# Patient Record
Sex: FEMALE | Race: ASIAN
[De-identification: names, ages, dates, MRNs, and addresses within clinical notes are randomized per-mention and may not be internally consistent; named-entity substitution may affect disease eponyms.]

---

## 2023-02-22 ENCOUNTER — HOSPITAL ENCOUNTER (EMERGENCY)
Dept: HOSPITAL 75 - ER | Age: 73
Discharge: HOME | End: 2023-02-22
Payer: MEDICARE

## 2023-02-22 VITALS — DIASTOLIC BLOOD PRESSURE: 130 MMHG | SYSTOLIC BLOOD PRESSURE: 206 MMHG

## 2023-02-22 VITALS — WEIGHT: 139.99 LBS | HEIGHT: 60 IN | BODY MASS INDEX: 27.48 KG/M2

## 2023-02-22 DIAGNOSIS — Z79.82: ICD-10-CM

## 2023-02-22 DIAGNOSIS — S42.001A: Primary | ICD-10-CM
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
## 2023-02-22 DIAGNOSIS — E11.9: ICD-10-CM

## 2023-02-22 DIAGNOSIS — R07.89: ICD-10-CM
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
## 2023-02-22 DIAGNOSIS — Z88.5: ICD-10-CM

## 2023-02-22 LAB
ALBUMIN SERPL-MCNC: 4.3 GM/DL (ref 3.2–4.5)
ALP SERPL-CCNC: 79 U/L (ref 40–136)
ALT SERPL-CCNC: 20 U/L (ref 0–55)
APTT BLD: 33 SEC (ref 24–35)
BASOPHILS # BLD AUTO: 0.1 10^3/UL (ref 0–0.1)
BASOPHILS NFR BLD AUTO: 1 % (ref 0–10)
BILIRUB SERPL-MCNC: 0.4 MG/DL (ref 0.1–1)
BUN/CREAT SERPL: 22
CALCIUM SERPL-MCNC: 9.5 MG/DL (ref 8.5–10.1)
CHLORIDE SERPL-SCNC: 107 MMOL/L (ref 98–107)
CO2 SERPL-SCNC: 18 MMOL/L (ref 21–32)
CREAT SERPL-MCNC: 0.79 MG/DL (ref 0.6–1.3)
EOSINOPHIL # BLD AUTO: 0.1 10^3/UL (ref 0–0.3)
EOSINOPHIL NFR BLD AUTO: 1 % (ref 0–10)
GFR SERPLBLD BASED ON 1.73 SQ M-ARVRAT: 78 ML/MIN
GLUCOSE SERPL-MCNC: 127 MG/DL (ref 70–105)
HCT VFR BLD CALC: 40 % (ref 35–52)
HGB BLD-MCNC: 12.9 G/DL (ref 11.5–16)
INR PPP: 0.9 (ref 0.8–1.4)
LYMPHOCYTES # BLD AUTO: 1.8 10^3/UL (ref 1–4)
LYMPHOCYTES NFR BLD AUTO: 26 % (ref 12–44)
MANUAL DIFFERENTIAL PERFORMED BLD QL: NO
MCH RBC QN AUTO: 22 PG (ref 25–34)
MCHC RBC AUTO-ENTMCNC: 32 G/DL (ref 32–36)
MCV RBC AUTO: 70 FL (ref 80–99)
MONOCYTES # BLD AUTO: 0.7 10^3/UL (ref 0–1)
MONOCYTES NFR BLD AUTO: 9 % (ref 0–12)
NEUTROPHILS # BLD AUTO: 4.4 10^3/UL (ref 1.8–7.8)
NEUTROPHILS NFR BLD AUTO: 63 % (ref 42–75)
PLATELET # BLD: 349 10^3/UL (ref 130–400)
PMV BLD AUTO: 8.6 FL (ref 9–12.2)
POTASSIUM SERPL-SCNC: 4 MMOL/L (ref 3.6–5)
PROT SERPL-MCNC: 8.3 GM/DL (ref 6.4–8.2)
PROTHROMBIN TIME: 13 SEC (ref 12.2–14.7)
SODIUM SERPL-SCNC: 139 MMOL/L (ref 135–145)
WBC # BLD AUTO: 7.1 10^3/UL (ref 4.3–11)

## 2023-02-22 PROCEDURE — 85730 THROMBOPLASTIN TIME PARTIAL: CPT

## 2023-02-22 PROCEDURE — 80053 COMPREHEN METABOLIC PANEL: CPT

## 2023-02-22 PROCEDURE — 36415 COLL VENOUS BLD VENIPUNCTURE: CPT

## 2023-02-22 PROCEDURE — 73030 X-RAY EXAM OF SHOULDER: CPT

## 2023-02-22 PROCEDURE — 70450 CT HEAD/BRAIN W/O DYE: CPT

## 2023-02-22 PROCEDURE — 85610 PROTHROMBIN TIME: CPT

## 2023-02-22 PROCEDURE — 73060 X-RAY EXAM OF HUMERUS: CPT

## 2023-02-22 PROCEDURE — 85025 COMPLETE CBC W/AUTO DIFF WBC: CPT

## 2023-02-22 PROCEDURE — 71045 X-RAY EXAM CHEST 1 VIEW: CPT

## 2023-02-22 PROCEDURE — 72125 CT NECK SPINE W/O DYE: CPT

## 2023-02-22 PROCEDURE — 80320 DRUG SCREEN QUANTALCOHOLS: CPT

## 2023-02-22 PROCEDURE — 82947 ASSAY GLUCOSE BLOOD QUANT: CPT

## 2023-02-22 NOTE — ED FALL/INJURY
General


Stated Complaint:  FALL


Source:  patient (SPEECH IS DIFFICULT TO UNDERSTAND AT TIMES. )





History of Present Illness


Date Seen by Provider:  Feb 22, 2023


Time Seen by Provider:  01:08


Initial Comments


PT ARRIVES VIA EMS FROM LOCAL [facility]--PT LIVES IN [place], [place]


[facility] RIGHT SIDE


C/O PAIN TO RIGHT SHOULDER , UPPER ARM AND COLLAR BONE


DID HIT HER HEAD, BUT DENIES ANY LOSS OF CONSCIOUSNESS


DENIES ANY NECK OR BACK PAIN


DENIES ANY PARESTHESIAS OR MOTOR DEFICITS


DENIES ANY HIP OR LEG PAIN 





PT IS ON 81 MG ASPIRIN


SHE IS DIABETIC, HAS HTN, HX OF CVA, USES AN INHALER





SHE DENIES SMOKING, ALCOHOL OR DRUG USE





PCP:DR. TAVAREZ IN [place], [place]





Allergies and Home Medications


Allergies


Coded Allergies:  


     codeine (Verified  Allergy, Unknown, 2/22/23)





Review of Systems


Review of Systems


Constitutional:  no symptoms reported


Eyes:  No Symptoms Reported


Ears, Nose, Mouth, Throat:  no symptoms reported


Respiratory:  no symptoms reported


Cardiovascular:  no symptoms reported


Gastrointestinal:  no symptoms reported


Genitourinary:  no symptoms reported


Musculoskeletal:  see HPI


Skin:  no symptoms reported


Psychiatric/Neurological:  No Symptoms Reported





Past Medical-Social-Family Hx


Patient Social History


Tobacco Use?:  No


Use of E-Cig and/or Vaping dev:  No


Substance use?:  No


Alcohol Use?:  No





Past Medical History


Respiratory:  Yes (USES AN INHALER--PT UNABLE TO STATE WHAT LUNG PROBLEM SHE 

HAS)


Cardiac:  Yes


High Cholesterol, Hypertension


Neurological:  Yes


Stroke


GYN History:  Menopausal


Genitourinary:  Yes (INCONTINENCE)


Gastrointestinal:  No


Musculoskeletal:  Yes (LEFT WRIST FX AS CHILD)


Fractures


Endocrine:  Yes


Diabetes, Non-Insulin dep


HEENT:  No (GLASSES)


Cancer:  No


Psychosocial:  No


Integumentary:  No


Blood Disorders:  No





Physical Exam


Vital Signs





Vital Signs - First Documented








 2/22/23





 01:08


 


Pulse 72


 


Resp 18


 


B/P (MAP) 206/130 (155)


 


Pulse Ox 98


 


O2 Delivery Room Air





Capillary Refill :


Height, Weight, BMI


Height: '"


Weight: lbs. oz. kg;  BMI


Method:


General Appearance:  WD/WN, no apparent distress


HEENT:  PERRL/EOMI


Neck:  non-tender, full range of motion, supple, normal inspection


Cardiovascular:  regular rate, rhythm, no murmur


Respiratory:  normal breath sounds, no respiratory distress, no accessory muscle

use, other (RIGHT UPPER CHEST TENDERNESS. NO CREPITANCE OR SUB Q AIR. )


Gastrointestinal:  non tender, soft


Back:  other (RIGHT UPPER BACK/SCAPULAR /TRAPEZIUS TENDERNESS. )


Extremities:  normal capillary refill, other (TENDERNESS TO RIGHT MID AND UPPER 

HUMERUS, RIGHT SHOULDER AND ENTIRE RIGHT CLAVICLE. NO DEFORMITY. LIMITED ROM OF 

RIGHT SHOULDER)


Neurologic/Psychiatric:  CNs II-XII nml as tested, no motor/sensory deficits, 

alert, normal mood/affect, oriented x 3


Skin:  normal color (DARK SKINNED), warm/dry; No ecchymosis





M[place] Coma Score


Best Eye Response:  (4) Open Spontaneously


Best Verbal Response:  (5) Oriented


Best Motor Response:  (6) Obeys Commands


[place] Total:  15





Progress/Results/Core Measures


Results/Orders


Lab Results





Laboratory Tests








Test


 2/22/23


01:17 Range/Units


 


 


White Blood Count


 7.1 


 4.3-11.0


10^3/uL


 


Red Blood Count


 5.76 H


 3.80-5.11


10^6/uL


 


Hemoglobin 12.9  11.5-16.0  g/dL


 


Hematocrit 40  35-52  %


 


Mean Corpuscular Volume 70 L 80-99  fL


 


Mean Corpuscular Hemoglobin 22 L 25-34  pg


 


Mean Corpuscular Hemoglobin


Concent 32 


 32-36  g/dL





 


Red Cell Distribution Width 15.5 H 10.0-14.5  %


 


Platelet Count


 349 


 130-400


10^3/uL


 


Mean Platelet Volume 8.6 L 9.0-12.2  fL


 


Immature Granulocyte % (Auto) 0   %


 


Neutrophils (%) (Auto) 63  42-75  %


 


Lymphocytes (%) (Auto) 26  12-44  %


 


Monocytes (%) (Auto) 9  0-12  %


 


Eosinophils (%) (Auto) 1  0-10  %


 


Basophils (%) (Auto) 1  0-10  %


 


Neutrophils # (Auto)


 4.4 


 1.8-7.8


10^3/uL


 


Lymphocytes # (Auto)


 1.8 


 1.0-4.0


10^3/uL


 


Monocytes # (Auto)


 0.7 


 0.0-1.0


10^3/uL


 


Eosinophils # (Auto)


 0.1 


 0.0-0.3


10^3/uL


 


Basophils # (Auto)


 0.1 


 0.0-0.1


10^3/uL


 


Immature Granulocyte # (Auto)


 0.0 


 0.0-0.1


10^3/uL


 


Prothrombin Time 13.0  12.2-14.7  SEC


 


INR Comment 0.9  0.8-1.4  


 


Activated Partial


Thromboplast Time 33 


 24-35  SEC





 


Sodium Level 139  135-145  MMOL/L


 


Potassium Level 4.0  3.6-5.0  MMOL/L


 


Chloride Level 107    MMOL/L


 


Carbon Dioxide Level 18 L 21-32  MMOL/L


 


Anion Gap 14  5-14  MMOL/L


 


Blood Urea Nitrogen 17  7-18  MG/DL


 


Creatinine


 0.79 


 0.60-1.30


MG/DL


 


Estimat Glomerular Filtration


Rate 78 


  





 


BUN/Creatinine Ratio 22   


 


Glucose Level 127 H   MG/DL


 


Calcium Level 9.5  8.5-10.1  MG/DL


 


Corrected Calcium 9.3  8.5-10.1  MG/DL


 


Total Bilirubin 0.4  0.1-1.0  MG/DL


 


Aspartate Amino Transf


(AST/SGOT) 23 


 5-34  U/L





 


Alanine Aminotransferase


(ALT/SGPT) 20 


 0-55  U/L





 


Alkaline Phosphatase 79    U/L


 


Total Protein 8.3 H 6.4-8.2  GM/DL


 


Albumin 4.3  3.2-4.5  GM/DL


 


Serum Alcohol < 10  <10  MG/DL








My Orders





Orders - DARÍO STOVALL DO


Ct Head/Cervical Spine Wo (2/22/23 01:17)


Chest 1 View, Ap/Pa Only (2/22/23 01:17)


Shoulder, Right, 3 Views (2/22/23 01:17)


Humerus, Right, 2 Views (2/22/23 01:17)


Alcohol (2/22/23 01:17)


Cbc With Automated Diff (2/22/23 01:17)


Comprehensive Metabolic Panel (2/22/23 01:17)


Protime With Inr (2/22/23 01:17)


Partial Thromboplastin Time (2/22/23 01:17)


Ketorolac Injection (Toradol Injection) (2/22/23 03:30)


Ed Ortho/Other Supplies Order (2/22/23 03:35)


Accucheck Stat ONCE (2/22/23 03:37)


Rx-Tramadol Hcl (Rx-Ultram) (2/22/23 03:39)





Medications Given in ED





Current Medications








 Medications  Dose


 Ordered  Sig/Mckayla


 Route  Start Time


 Stop Time Status Last Admin


Dose Admin


 


 Ketorolac


 Tromethamine  30 mg  ONCE  ONCE


 IVP  2/22/23 03:30


 2/22/23 03:31 DC 2/22/23 03:31


30 MG








Vital Signs/I&O











 2/22/23





 01:08


 


Pulse 72


 


Resp 18


 


B/P (MAP) 206/130 (155)


 


Pulse Ox 98


 


O2 Delivery Room Air











Progress


Progress Note :  


Progress Note





NO PRIOR VISITS HERE





Departure


Impression





   Primary Impression:  


   FALL FROM BAR STOOL


   Additional Impressions:  


   Closed right clavicular fracture


   NIDDM


Disposition:  01 HOME, SELF-CARE


Condition:  Stable





Departure-Patient Inst.


Decision time for Depature:  03:40


Referrals:  


UNKNOWN (PCP)


Primary Care Physician


Patient Instructions:  Clavicle Fracture, Preventing Falls in Older Adults





Add. Discharge Instructions:  


WEAR SLING AT ALL TIMES





ICE TO SORE AREA AT 20 MINUTE INTERVALS





FOLLOW UP WITH DR. BARRON OR WITH ORTHOPEDIC SURGEON OF CHOICE THIS WEEK FOR 

FURTHER CARE--CALL IN THE MORNING TO SCHEDULE APPOINTMENT.


Scripts


Tramadol HCl (Tramadol HCl) 50 Mg Tablet


50 MG PO Q6H PRN for PAIN, #20 TAB 0 Refills


   Prov: DARÍO STOVALL DO         2/22/23











DARÍO STOVALL DO                 Feb 22, 2023 01:24

## 2023-02-22 NOTE — DIAGNOSTIC IMAGING REPORT
EXAMINATION: 

Right humerus radiographs, 2 views.



COMPARISON: 

None. 



HISTORY: 

72-year-old female, right humerus pain. 



FINDINGS: 

There is no identified acute fracture. There are right

acromioclavicular degenerative changes. Right upper quadrant

surgical clips likely reflect prior cholecystectomy. There is no

identified acute fracture. 



IMPRESSION: 

No acute bony abnormality of the right humerus. 



Dictated by: 



  Dictated on workstation # [id number]

## 2023-02-22 NOTE — DIAGNOSTIC IMAGING REPORT
EXAMINATION: 

Right shoulder radiographs, 3 views.



COMPARISON: 

None. 



HISTORY: 

72-year-old female, right shoulder pain. 



FINDINGS: 

There are right acromioclavicular degenerative changes. The

acromioclavicular joint is normally aligned. The humeral head is

normally positioned relative to the glenoid. There is no

identified acute fracture. The glenohumeral joint space is

well-maintained. 



IMPRESSION: 

1. No acute bony abnormality of the right shoulder.

2. Acromioclavicular degenerative changes. 



Dictated by: 



  Dictated on workstation # [id number] Lipid level in AM. Continue atorvastatin 20 mg at bedtime.

## 2023-02-22 NOTE — DIAGNOSTIC IMAGING REPORT
PROCEDURE: CT head and CT cervical spine without contrast.



TECHNIQUE: Multiple contiguous axial images were obtained through

the brain and cervical spine without the use of intravenous

contrast. Sagittal and coronal reformations through the cervical

spine were then performed. Auto Exposure Controls were utilized

during the CT exam to meet ALARA standards for radiation dose

reduction. 



DATE: February 20, 2023.



COMPARISON: None. 



INDICATION: 72-year-old female, fall. Head and neck pain.



FINDINGS: 



There is a partially calcified extra-axial lesion adjacent of the

high left frontal parietal region which measures 2.3 x 1.3 cm in

size on axial image 54. This most likely reflects a meningioma.

There are symmetric bilateral basal ganglia calcifications. The

ventricles and additional CSF spaces are within normal limits and

size and configuration for patient age. There is no identified

abnormal extra-axial fluid collection. There is no evidence of

acute intracranial hemorrhage. There is no midline shift. There

is no identified a skull fracture. There is postoperative related

changes in the region of the nose. There is fluid layering in the

right sphenoid sinus with bubbly areas of internal lucency. The

mastoid air cells and middle ears are well-aerated bilaterally.



There is no identified facet joint subluxation or dislocation.

There are multilevel facet degenerative changes of the cervical

spine. There is no asymmetric widening of the cervical disc

spaces. There is chondrocalcinosis and arthritis at the C1-C2

articulation. There are additional disc degenerative changes of

the cervical spine. CT is limited for assessment of disc

pathology as well as additional non-bony causes of pathology in

the spinal canal. There is no identified acute fracture of the

cervical spine.



Visualized portions of the lung apices are clear. The distal

ascending aorta measures up to 4.1 cm in diameter.



IMPRESSION: 

1. No identified acute intracranial abnormality.

2. Partially calcified extra-axial mass in the left likely

reflecting meningioma with mass measurements as above.

3. No identified acute abnormality of the cervical spine.

4. Multilevel degenerative changes of the cervical spine.

5. Aneurysmal dilation of the distal ascending thoracic aorta

measuring up to 4.1 cm in diameter. 

6. Fluid layering in the right sphenoid sinus which may reflect

acute sinusitis. Recommend correlation.

  



Dictated by: 



  Dictated on workstation # [id number]

## 2023-02-22 NOTE — DIAGNOSTIC IMAGING REPORT
EXAMINATION: 

Chest radiograph, portable AP view.



DATE: 

02/22/2023 2:39 AM.



INDICATION: 

72-year-old female, fall. Chest pain.



COMPARISON: 

None.



FINDINGS:

The heart size and mediastinal contours are unremarkable. There

is no identified pneumothorax. There is no large pleural

effusion. There is no identified focal airspace consolidation.



IMPRESSION: 

No identified acute cardiopulmonary abnormality.



Dictated by: 



  Dictated on workstation # [id number]